# Patient Record
Sex: MALE | URBAN - METROPOLITAN AREA
[De-identification: names, ages, dates, MRNs, and addresses within clinical notes are randomized per-mention and may not be internally consistent; named-entity substitution may affect disease eponyms.]

---

## 2018-03-20 ENCOUNTER — TELEPHONE (OUTPATIENT)
Dept: FAMILY MEDICINE CLINIC | Facility: CLINIC | Age: 63
End: 2018-03-20

## 2018-03-20 NOTE — TELEPHONE ENCOUNTER
No record of Hep B injections noted in [facility] or [facility]. Will need to see if patient has a paper chart with records.

## 2018-03-20 NOTE — TELEPHONE ENCOUNTER
No record of Hep B injections noted in patient's paper chart. Patient informed that unfortunately we do not have any records to provide him with.